# Patient Record
Sex: FEMALE | Race: WHITE
[De-identification: names, ages, dates, MRNs, and addresses within clinical notes are randomized per-mention and may not be internally consistent; named-entity substitution may affect disease eponyms.]

---

## 2017-05-18 ENCOUNTER — HOSPITAL ENCOUNTER (OUTPATIENT)
Dept: HOSPITAL 45 - C.RADBC | Age: 37
Discharge: HOME | End: 2017-05-18
Attending: PHYSICIAN ASSISTANT
Payer: COMMERCIAL

## 2017-05-18 DIAGNOSIS — M51.36: ICD-10-CM

## 2017-05-18 DIAGNOSIS — M51.37: ICD-10-CM

## 2017-05-18 DIAGNOSIS — M54.16: Primary | ICD-10-CM

## 2017-05-18 NOTE — DIAGNOSTIC IMAGING REPORT
L-SPINE MIN 4 VIEWS ROUTINE



CLINICAL HISTORY: LUMBAR RADICULOPATHY    



COMPARISON STUDY:  1/10/2015



FINDINGS: No acute fractures or subluxations are visualized. There is an L4-5

interspinous spacer. There are degenerative changes with progressive disc space

narrowing at the L4-5, and L5-S1 levels.



IMPRESSION:  

1. Progressive degenerative changes at the L4-5 and L5-S1 levels

2. Interval placement of an L4-5 interspinous spacer. 









Electronically signed by:  Santhosh Crowley M.D.

5/18/2017 12:06 PM



Dictated Date/Time:  5/18/2017 12:05 PM

## 2017-06-01 ENCOUNTER — HOSPITAL ENCOUNTER (OUTPATIENT)
Dept: HOSPITAL 45 - C.MRIBC | Age: 37
Discharge: HOME | End: 2017-06-01
Attending: PHYSICIAN ASSISTANT
Payer: COMMERCIAL

## 2017-06-01 DIAGNOSIS — M51.17: Primary | ICD-10-CM

## 2017-10-03 ENCOUNTER — HOSPITAL ENCOUNTER (OUTPATIENT)
Dept: HOSPITAL 45 - C.LABPBG | Age: 37
Discharge: HOME | End: 2017-10-03
Attending: INTERNAL MEDICINE
Payer: COMMERCIAL

## 2017-10-03 DIAGNOSIS — E03.9: Primary | ICD-10-CM

## 2017-10-03 DIAGNOSIS — R73.9: ICD-10-CM

## 2017-10-03 DIAGNOSIS — E55.9: ICD-10-CM

## 2017-10-03 DIAGNOSIS — E78.5: ICD-10-CM

## 2017-10-03 LAB
CHOLEST/HDLC SERPL: 8.5 {RATIO}
EST. AVERAGE GLUCOSE BLD GHB EST-MCNC: 114 MG/DL
GLUCOSE UR QL: 35 MG/DL
HA1C FLAG: 25
KETONES UR QL STRIP: 189 MG/DL
NITRITE UR QL STRIP: 367 MG/DL (ref 0–150)
PH UR: 297 MG/DL (ref 0–200)
TSH SERPL-ACNC: 6.14 UIU/ML (ref 0.3–4.5)
VERY LOW DENSITY LIPOPROT CALC: 73 MG/DL

## 2018-07-26 ENCOUNTER — HOSPITAL ENCOUNTER (OUTPATIENT)
Dept: HOSPITAL 45 - C.LABPBG | Age: 38
Discharge: HOME | End: 2018-07-26
Attending: INTERNAL MEDICINE
Payer: COMMERCIAL

## 2018-07-26 DIAGNOSIS — R73.9: ICD-10-CM

## 2018-07-26 DIAGNOSIS — E78.6: ICD-10-CM

## 2018-07-26 DIAGNOSIS — E55.9: ICD-10-CM

## 2018-07-26 DIAGNOSIS — E78.5: ICD-10-CM

## 2018-07-26 DIAGNOSIS — E78.1: ICD-10-CM

## 2018-07-26 DIAGNOSIS — E03.9: ICD-10-CM

## 2018-07-26 DIAGNOSIS — F41.8: Primary | ICD-10-CM

## 2018-07-26 LAB
ALBUMIN SERPL-MCNC: 3.8 GM/DL (ref 3.4–5)
ALP SERPL-CCNC: 67 U/L (ref 45–117)
ALT SERPL-CCNC: 64 U/L (ref 12–78)
AST SERPL-CCNC: 39 U/L (ref 15–37)
BASOPHILS # BLD: 0.05 K/UL (ref 0–0.2)
BASOPHILS NFR BLD: 0.5 %
BUN SERPL-MCNC: 10 MG/DL (ref 7–18)
CALCIUM SERPL-MCNC: 8.7 MG/DL (ref 8.5–10.1)
CO2 SERPL-SCNC: 27 MMOL/L (ref 21–32)
CREAT SERPL-MCNC: 0.61 MG/DL (ref 0.6–1.2)
EOS ABS #: 0.26 K/UL (ref 0–0.5)
EOSINOPHIL NFR BLD AUTO: 292 K/UL (ref 130–400)
GLUCOSE SERPL-MCNC: 94 MG/DL (ref 70–99)
HBA1C MFR BLD: 5.7 % (ref 4.5–5.6)
HCT VFR BLD CALC: 40.9 % (ref 37–47)
HGB BLD-MCNC: 13.2 G/DL (ref 12–16)
IG#: 0.03 K/UL (ref 0–0.02)
IMM GRANULOCYTES NFR BLD AUTO: 23.4 %
KETONES UR QL STRIP: 70 MG/DL
LYMPHOCYTES # BLD: 2.5 K/UL (ref 1.2–3.4)
MCH RBC QN AUTO: 30.1 PG (ref 25–34)
MCHC RBC AUTO-ENTMCNC: 32.3 G/DL (ref 32–36)
MCV RBC AUTO: 93.4 FL (ref 80–100)
MONO ABS #: 0.71 K/UL (ref 0.11–0.59)
MONOCYTES NFR BLD: 6.6 %
NEUT ABS #: 7.14 K/UL (ref 1.4–6.5)
NEUTROPHILS # BLD AUTO: 2.4 %
NEUTROPHILS NFR BLD AUTO: 66.8 %
PH UR: 161 MG/DL (ref 0–200)
PMV BLD AUTO: 12.5 FL (ref 7.4–10.4)
POTASSIUM SERPL-SCNC: 3.9 MMOL/L (ref 3.5–5.1)
PROT SERPL-MCNC: 7.4 GM/DL (ref 6.4–8.2)
RED CELL DISTRIBUTION WIDTH CV: 13.7 % (ref 11.5–14.5)
RED CELL DISTRIBUTION WIDTH SD: 46.8 FL (ref 36.4–46.3)
SODIUM SERPL-SCNC: 137 MMOL/L (ref 136–145)
WBC # BLD AUTO: 10.69 K/UL (ref 4.8–10.8)